# Patient Record
Sex: FEMALE
[De-identification: names, ages, dates, MRNs, and addresses within clinical notes are randomized per-mention and may not be internally consistent; named-entity substitution may affect disease eponyms.]

---

## 2017-07-06 ENCOUNTER — TRANSCRIPTION ENCOUNTER (OUTPATIENT)
Age: 32
End: 2017-07-06

## 2018-08-14 ENCOUNTER — INPATIENT (INPATIENT)
Facility: HOSPITAL | Age: 33
LOS: 2 days | Discharge: HOME | End: 2018-08-17
Attending: OBSTETRICS & GYNECOLOGY | Admitting: OBSTETRICS & GYNECOLOGY
Payer: COMMERCIAL

## 2018-08-14 DIAGNOSIS — O26.899 OTHER SPECIFIED PREGNANCY RELATED CONDITIONS, UNSPECIFIED TRIMESTER: ICD-10-CM

## 2018-08-14 DIAGNOSIS — Z3A.00 WEEKS OF GESTATION OF PREGNANCY NOT SPECIFIED: ICD-10-CM

## 2018-08-14 DIAGNOSIS — Z34.80 ENCOUNTER FOR SUPERVISION OF OTHER NORMAL PREGNANCY, UNSPECIFIED TRIMESTER: ICD-10-CM

## 2018-08-14 RX ORDER — CITRIC ACID/SODIUM CITRATE 300-500 MG
15 SOLUTION, ORAL ORAL EVERY 4 HOURS
Refills: 0 | Status: DISCONTINUED | OUTPATIENT
Start: 2018-08-14 | End: 2018-08-15

## 2018-08-14 RX ORDER — AMPICILLIN TRIHYDRATE 250 MG
2 CAPSULE ORAL ONCE
Refills: 0 | Status: COMPLETED | OUTPATIENT
Start: 2018-08-14 | End: 2018-08-15

## 2018-08-14 RX ORDER — SODIUM CHLORIDE 9 MG/ML
999 INJECTION, SOLUTION INTRAVENOUS ONCE
Refills: 0 | Status: COMPLETED | OUTPATIENT
Start: 2018-08-14 | End: 2018-08-15

## 2018-08-14 RX ORDER — OXYTOCIN 10 UNIT/ML
333.33 VIAL (ML) INJECTION
Qty: 20 | Refills: 0 | Status: COMPLETED | OUTPATIENT
Start: 2018-08-14

## 2018-08-14 RX ORDER — AMPICILLIN TRIHYDRATE 250 MG
1 CAPSULE ORAL EVERY 4 HOURS
Refills: 0 | Status: DISCONTINUED | OUTPATIENT
Start: 2018-08-15 | End: 2018-08-15

## 2018-08-14 RX ORDER — SODIUM CHLORIDE 9 MG/ML
1000 INJECTION, SOLUTION INTRAVENOUS
Refills: 0 | Status: DISCONTINUED | OUTPATIENT
Start: 2018-08-14 | End: 2018-08-15

## 2018-08-14 RX ORDER — AMPICILLIN TRIHYDRATE 250 MG
CAPSULE ORAL
Refills: 0 | Status: DISCONTINUED | OUTPATIENT
Start: 2018-08-15 | End: 2018-08-15

## 2018-08-15 VITALS — WEIGHT: 158.73 LBS | HEIGHT: 64 IN

## 2018-08-15 LAB
BASOPHILS # BLD AUTO: 0 K/UL — SIGNIFICANT CHANGE UP (ref 0–0.2)
BASOPHILS NFR BLD AUTO: 0.1 % — SIGNIFICANT CHANGE UP (ref 0–2)
BLD GP AB SCN SERPL QL: NEGATIVE — SIGNIFICANT CHANGE UP
EOSINOPHIL # BLD AUTO: 0.1 K/UL — SIGNIFICANT CHANGE UP (ref 0–0.5)
EOSINOPHIL NFR BLD AUTO: 0.4 % — SIGNIFICANT CHANGE UP (ref 0–6)
HCT VFR BLD CALC: 34.3 % — LOW (ref 34.5–45)
HGB BLD-MCNC: 11.9 G/DL — SIGNIFICANT CHANGE UP (ref 11.5–15.5)
HIV 1 & 2 AB SERPL IA.RAPID: SIGNIFICANT CHANGE UP
LYMPHOCYTES # BLD AUTO: 1.7 K/UL — SIGNIFICANT CHANGE UP (ref 1–3.3)
LYMPHOCYTES # BLD AUTO: 10 % — LOW (ref 13–44)
MCHC RBC-ENTMCNC: 30.9 PG — SIGNIFICANT CHANGE UP (ref 27–34)
MCHC RBC-ENTMCNC: 34.7 GM/DL — SIGNIFICANT CHANGE UP (ref 32–36)
MCV RBC AUTO: 89.1 FL — SIGNIFICANT CHANGE UP (ref 80–100)
MONOCYTES # BLD AUTO: 1 K/UL — HIGH (ref 0–0.9)
MONOCYTES NFR BLD AUTO: 6 % — SIGNIFICANT CHANGE UP (ref 2–14)
NEUTROPHILS # BLD AUTO: 14 K/UL — HIGH (ref 1.8–7.4)
NEUTROPHILS NFR BLD AUTO: 83.5 % — HIGH (ref 43–77)
PLATELET # BLD AUTO: 255 K/UL — SIGNIFICANT CHANGE UP (ref 150–400)
RBC # BLD: 3.85 M/UL — SIGNIFICANT CHANGE UP (ref 3.8–5.2)
RBC # FLD: 12.7 % — SIGNIFICANT CHANGE UP (ref 10.3–14.5)
RH IG SCN BLD-IMP: POSITIVE — SIGNIFICANT CHANGE UP
RH IG SCN BLD-IMP: POSITIVE — SIGNIFICANT CHANGE UP
T PALLIDUM AB TITR SER: NEGATIVE — SIGNIFICANT CHANGE UP
WBC # BLD: 16.7 K/UL — HIGH (ref 3.8–10.5)
WBC # FLD AUTO: 16.7 K/UL — HIGH (ref 3.8–10.5)

## 2018-08-15 RX ORDER — AER TRAVELER 0.5 G/1
1 SOLUTION RECTAL; TOPICAL EVERY 4 HOURS
Refills: 0 | Status: DISCONTINUED | OUTPATIENT
Start: 2018-08-16 | End: 2018-08-17

## 2018-08-15 RX ORDER — GLYCERIN ADULT
1 SUPPOSITORY, RECTAL RECTAL AT BEDTIME
Refills: 0 | Status: DISCONTINUED | OUTPATIENT
Start: 2018-08-16 | End: 2018-08-17

## 2018-08-15 RX ORDER — ACETAMINOPHEN 500 MG
975 TABLET ORAL EVERY 6 HOURS
Refills: 0 | Status: COMPLETED | OUTPATIENT
Start: 2018-08-15 | End: 2019-07-14

## 2018-08-15 RX ORDER — DOCUSATE SODIUM 100 MG
100 CAPSULE ORAL
Refills: 0 | Status: DISCONTINUED | OUTPATIENT
Start: 2018-08-16 | End: 2018-08-17

## 2018-08-15 RX ORDER — DIPHENHYDRAMINE HCL 50 MG
25 CAPSULE ORAL EVERY 6 HOURS
Refills: 0 | Status: DISCONTINUED | OUTPATIENT
Start: 2018-08-16 | End: 2018-08-17

## 2018-08-15 RX ORDER — OXYTOCIN 10 UNIT/ML
41.67 VIAL (ML) INJECTION
Qty: 20 | Refills: 0 | Status: DISCONTINUED | OUTPATIENT
Start: 2018-08-16 | End: 2018-08-17

## 2018-08-15 RX ORDER — ACETAMINOPHEN 500 MG
975 TABLET ORAL EVERY 6 HOURS
Refills: 0 | Status: COMPLETED | OUTPATIENT
Start: 2018-08-16 | End: 2019-07-15

## 2018-08-15 RX ORDER — OXYCODONE HYDROCHLORIDE 5 MG/1
5 TABLET ORAL
Refills: 0 | Status: DISCONTINUED | OUTPATIENT
Start: 2018-08-16 | End: 2018-08-17

## 2018-08-15 RX ORDER — IBUPROFEN 200 MG
600 TABLET ORAL EVERY 6 HOURS
Refills: 0 | Status: DISCONTINUED | OUTPATIENT
Start: 2018-08-15 | End: 2018-08-17

## 2018-08-15 RX ORDER — PRAMOXINE HYDROCHLORIDE 150 MG/15G
1 AEROSOL, FOAM RECTAL EVERY 4 HOURS
Refills: 0 | Status: DISCONTINUED | OUTPATIENT
Start: 2018-08-16 | End: 2018-08-17

## 2018-08-15 RX ORDER — AER TRAVELER 0.5 G/1
1 SOLUTION RECTAL; TOPICAL EVERY 4 HOURS
Refills: 0 | Status: DISCONTINUED | OUTPATIENT
Start: 2018-08-15 | End: 2018-08-15

## 2018-08-15 RX ORDER — KETOROLAC TROMETHAMINE 30 MG/ML
30 SYRINGE (ML) INJECTION ONCE
Refills: 0 | Status: DISCONTINUED | OUTPATIENT
Start: 2018-08-15 | End: 2018-08-17

## 2018-08-15 RX ORDER — SIMETHICONE 80 MG/1
80 TABLET, CHEWABLE ORAL EVERY 6 HOURS
Refills: 0 | Status: DISCONTINUED | OUTPATIENT
Start: 2018-08-16 | End: 2018-08-17

## 2018-08-15 RX ORDER — OXYCODONE HYDROCHLORIDE 5 MG/1
5 TABLET ORAL EVERY 4 HOURS
Refills: 0 | Status: DISCONTINUED | OUTPATIENT
Start: 2018-08-16 | End: 2018-08-17

## 2018-08-15 RX ORDER — IBUPROFEN 200 MG
600 TABLET ORAL EVERY 6 HOURS
Refills: 0 | Status: COMPLETED | OUTPATIENT
Start: 2018-08-15 | End: 2019-07-14

## 2018-08-15 RX ORDER — PRAMOXINE HYDROCHLORIDE 150 MG/15G
1 AEROSOL, FOAM RECTAL EVERY 4 HOURS
Refills: 0 | Status: DISCONTINUED | OUTPATIENT
Start: 2018-08-15 | End: 2018-08-15

## 2018-08-15 RX ORDER — IBUPROFEN 200 MG
600 TABLET ORAL EVERY 6 HOURS
Refills: 0 | Status: DISCONTINUED | OUTPATIENT
Start: 2018-08-16 | End: 2018-08-17

## 2018-08-15 RX ORDER — OXYTOCIN 10 UNIT/ML
41.67 VIAL (ML) INJECTION
Qty: 20 | Refills: 0 | Status: DISCONTINUED | OUTPATIENT
Start: 2018-08-15 | End: 2018-08-17

## 2018-08-15 RX ORDER — DIBUCAINE 1 %
1 OINTMENT (GRAM) RECTAL EVERY 4 HOURS
Refills: 0 | Status: DISCONTINUED | OUTPATIENT
Start: 2018-08-16 | End: 2018-08-17

## 2018-08-15 RX ORDER — OXYCODONE HYDROCHLORIDE 5 MG/1
5 TABLET ORAL EVERY 4 HOURS
Refills: 0 | Status: DISCONTINUED | OUTPATIENT
Start: 2018-08-15 | End: 2018-08-17

## 2018-08-15 RX ORDER — TETANUS TOXOID, REDUCED DIPHTHERIA TOXOID AND ACELLULAR PERTUSSIS VACCINE, ADSORBED 5; 2.5; 8; 8; 2.5 [IU]/.5ML; [IU]/.5ML; UG/.5ML; UG/.5ML; UG/.5ML
0.5 SUSPENSION INTRAMUSCULAR ONCE
Refills: 0 | Status: DISCONTINUED | OUTPATIENT
Start: 2018-08-16 | End: 2018-08-17

## 2018-08-15 RX ORDER — HYDROCORTISONE 1 %
1 OINTMENT (GRAM) TOPICAL EVERY 4 HOURS
Refills: 0 | Status: DISCONTINUED | OUTPATIENT
Start: 2018-08-15 | End: 2018-08-15

## 2018-08-15 RX ORDER — OXYCODONE HYDROCHLORIDE 5 MG/1
5 TABLET ORAL
Refills: 0 | Status: DISCONTINUED | OUTPATIENT
Start: 2018-08-15 | End: 2018-08-17

## 2018-08-15 RX ORDER — OXYTOCIN 10 UNIT/ML
333.33 VIAL (ML) INJECTION
Qty: 20 | Refills: 0 | Status: DISCONTINUED | OUTPATIENT
Start: 2018-08-15 | End: 2018-08-15

## 2018-08-15 RX ORDER — OXYTOCIN 10 UNIT/ML
4 VIAL (ML) INJECTION
Qty: 30 | Refills: 0 | Status: DISCONTINUED | OUTPATIENT
Start: 2018-08-15 | End: 2018-08-15

## 2018-08-15 RX ORDER — DIBUCAINE 1 %
1 OINTMENT (GRAM) RECTAL EVERY 4 HOURS
Refills: 0 | Status: DISCONTINUED | OUTPATIENT
Start: 2018-08-15 | End: 2018-08-15

## 2018-08-15 RX ORDER — KETOROLAC TROMETHAMINE 30 MG/ML
30 SYRINGE (ML) INJECTION ONCE
Refills: 0 | Status: DISCONTINUED | OUTPATIENT
Start: 2018-08-15 | End: 2018-08-15

## 2018-08-15 RX ORDER — ACETAMINOPHEN 500 MG
975 TABLET ORAL EVERY 6 HOURS
Refills: 0 | Status: DISCONTINUED | OUTPATIENT
Start: 2018-08-15 | End: 2018-08-17

## 2018-08-15 RX ORDER — LANOLIN
1 OINTMENT (GRAM) TOPICAL EVERY 6 HOURS
Refills: 0 | Status: DISCONTINUED | OUTPATIENT
Start: 2018-08-16 | End: 2018-08-17

## 2018-08-15 RX ORDER — SODIUM CHLORIDE 9 MG/ML
3 INJECTION INTRAMUSCULAR; INTRAVENOUS; SUBCUTANEOUS EVERY 8 HOURS
Refills: 0 | Status: DISCONTINUED | OUTPATIENT
Start: 2018-08-15 | End: 2018-08-15

## 2018-08-15 RX ORDER — MAGNESIUM HYDROXIDE 400 MG/1
30 TABLET, CHEWABLE ORAL
Refills: 0 | Status: DISCONTINUED | OUTPATIENT
Start: 2018-08-16 | End: 2018-08-17

## 2018-08-15 RX ORDER — SODIUM CHLORIDE 9 MG/ML
3 INJECTION INTRAMUSCULAR; INTRAVENOUS; SUBCUTANEOUS EVERY 8 HOURS
Refills: 0 | Status: DISCONTINUED | OUTPATIENT
Start: 2018-08-16 | End: 2018-08-17

## 2018-08-15 RX ORDER — HYDROCORTISONE 1 %
1 OINTMENT (GRAM) TOPICAL EVERY 4 HOURS
Refills: 0 | Status: DISCONTINUED | OUTPATIENT
Start: 2018-08-16 | End: 2018-08-17

## 2018-08-15 RX ADMIN — Medication 108 GRAM(S): at 09:18

## 2018-08-15 RX ADMIN — Medication 4 MILLIUNIT(S)/MIN: at 08:13

## 2018-08-15 RX ADMIN — Medication 108 GRAM(S): at 05:10

## 2018-08-15 RX ADMIN — SODIUM CHLORIDE 1998 MILLILITER(S): 9 INJECTION, SOLUTION INTRAVENOUS at 00:38

## 2018-08-15 RX ADMIN — Medication 30 MILLIGRAM(S): at 17:05

## 2018-08-15 RX ADMIN — Medication 975 MILLIGRAM(S): at 21:08

## 2018-08-15 RX ADMIN — Medication 1000 MILLIUNIT(S)/MIN: at 15:07

## 2018-08-15 RX ADMIN — Medication 116 GRAM(S): at 01:10

## 2018-08-15 RX ADMIN — Medication 975 MILLIGRAM(S): at 21:38

## 2018-08-15 RX ADMIN — Medication 108 GRAM(S): at 13:18

## 2018-08-15 RX ADMIN — SODIUM CHLORIDE 250 MILLILITER(S): 9 INJECTION, SOLUTION INTRAVENOUS at 02:24

## 2018-08-15 NOTE — PATIENT PROFILE OB - PATIENT REPRESENTATIVE PHONE
12/26/19        [address] [place] [id number]      Dear Gia Chavez,    [facility] records indicate that you have outstanding lab work and or testing that was ordered for you and has not yet been completed:  Orders Placed This Encounter [id number]

## 2018-08-15 NOTE — PATIENT PROFILE OB - ALERT: PERTINENT HISTORY
1st Trimester Sonogram/20 Week Level II Sonogram/Follow up Sonogram for Growth/Fetal Non-Stress Test (NST)

## 2018-08-16 LAB
HCT VFR BLD CALC: 29 % — LOW (ref 34.5–45)
HGB BLD-MCNC: 9.6 G/DL — LOW (ref 11.5–15.5)

## 2018-08-16 PROCEDURE — 93010 ELECTROCARDIOGRAM REPORT: CPT

## 2018-08-16 RX ORDER — ACETAMINOPHEN 500 MG
975 TABLET ORAL EVERY 6 HOURS
Refills: 0 | Status: DISCONTINUED | OUTPATIENT
Start: 2018-08-16 | End: 2018-08-16

## 2018-08-16 RX ADMIN — Medication 100 MILLIGRAM(S): at 11:02

## 2018-08-16 RX ADMIN — Medication 600 MILLIGRAM(S): at 01:45

## 2018-08-16 RX ADMIN — Medication 1 TABLET(S): at 11:02

## 2018-08-16 RX ADMIN — Medication 975 MILLIGRAM(S): at 12:39

## 2018-08-16 RX ADMIN — Medication 600 MILLIGRAM(S): at 02:15

## 2018-08-16 RX ADMIN — Medication 100 MILLIGRAM(S): at 20:14

## 2018-08-16 RX ADMIN — Medication 600 MILLIGRAM(S): at 20:45

## 2018-08-16 RX ADMIN — Medication 600 MILLIGRAM(S): at 08:07

## 2018-08-16 RX ADMIN — Medication 600 MILLIGRAM(S): at 20:14

## 2018-08-16 RX ADMIN — Medication 975 MILLIGRAM(S): at 13:10

## 2018-08-16 NOTE — CHART NOTE - NSCHARTNOTEFT_GEN_A_CORE
Pt seen and evaluated for chest discomfort. Pt reports dull pain in the center of her chest when she takes a deep breath. Started 5 minutes ago. Exacerbated by movement. Non-radiating. Reproducible with palpation. Pt reports she pushed for over an hour during her delivery earlier today. Denies calf pain. Denied difficulty breathing, shortness of breath, palpitations.     PE:  VS: /69, HR 60, RR 18, %O2: 96% RA  GA: NAD  Cardiac: RRR  Chest: tender to deep palpation centrally over sternum   Resp: clear to auscultation in all lung fields. No wheezes/rales/rhonchi  Ext: no calf tenderness    Bedside EKG: NSR    A&P: New onset chest discomfort, likely MUSK 2/2 to pushing during labor  -EKG WNL  -Continue to monitor, notify is pain worsens or develops SOB/difficulty breathing      D/w  Melody PGY-1 Pt seen and evaluated for chest discomfort. Pt reports dull pain in the center of her chest when she takes a deep breath. Started 5 minutes ago. Exacerbated by movement. Non-radiating. Reproducible with palpation. Pt reports she pushed for over an hour during her delivery earlier today. Denies calf pain. Denied difficulty breathing, shortness of breath, palpitations.     PE:  VS: /69, HR 60, RR 18, %O2: 96% RA  GA: NAD  Cardiac: RRR  Chest: tender to deep palpation centrally over sternum   Resp: clear to auscultation in all lung fields. No wheezes/rales/rhonchi  Ext: no calf tenderness    Bedside EKG: NSR    A&P: New onset chest discomfort, likely MUSK 2/2 to pushing during labor  -EKG WNL  -Continue to monitor, notify is pain worsens or develops SOB/difficulty breathing      D/w Dr. Webber, PGY-3  Melody PGY-1

## 2018-08-16 NOTE — PROGRESS NOTE ADULT - PROBLEM SELECTOR PLAN 1
- Pain well controlled, continue current pain regimen  - Increase ambulation, SCDs when not ambulating  - Continue regular diet  -Breast Feeding    Kenneth Mendoza PGY-1

## 2018-08-17 ENCOUNTER — TRANSCRIPTION ENCOUNTER (OUTPATIENT)
Age: 33
End: 2018-08-17

## 2018-08-17 VITALS
RESPIRATION RATE: 18 BRPM | DIASTOLIC BLOOD PRESSURE: 70 MMHG | SYSTOLIC BLOOD PRESSURE: 113 MMHG | HEART RATE: 60 BPM | TEMPERATURE: 98 F

## 2018-08-17 PROCEDURE — 86900 BLOOD TYPING SEROLOGIC ABO: CPT

## 2018-08-17 PROCEDURE — 59050 FETAL MONITOR W/REPORT: CPT

## 2018-08-17 PROCEDURE — 86901 BLOOD TYPING SEROLOGIC RH(D): CPT

## 2018-08-17 PROCEDURE — 86780 TREPONEMA PALLIDUM: CPT

## 2018-08-17 PROCEDURE — 59025 FETAL NON-STRESS TEST: CPT

## 2018-08-17 PROCEDURE — 85014 HEMATOCRIT: CPT

## 2018-08-17 PROCEDURE — 85018 HEMOGLOBIN: CPT

## 2018-08-17 PROCEDURE — 85027 COMPLETE CBC AUTOMATED: CPT

## 2018-08-17 PROCEDURE — G0463: CPT

## 2018-08-17 PROCEDURE — 93005 ELECTROCARDIOGRAM TRACING: CPT

## 2018-08-17 PROCEDURE — 86850 RBC ANTIBODY SCREEN: CPT

## 2018-08-17 PROCEDURE — 86703 HIV-1/HIV-2 1 RESULT ANTBDY: CPT

## 2018-08-17 RX ORDER — ACETAMINOPHEN 500 MG
3 TABLET ORAL
Qty: 0 | Refills: 0 | DISCHARGE
Start: 2018-08-17

## 2018-08-17 RX ORDER — IBUPROFEN 200 MG
1 TABLET ORAL
Qty: 0 | Refills: 0 | DISCHARGE
Start: 2018-08-17

## 2018-08-17 RX ADMIN — Medication 100 MILLIGRAM(S): at 11:28

## 2018-08-17 RX ADMIN — Medication 600 MILLIGRAM(S): at 06:20

## 2018-08-17 RX ADMIN — Medication 600 MILLIGRAM(S): at 12:10

## 2018-08-17 RX ADMIN — Medication 1 TABLET(S): at 11:28

## 2018-08-17 RX ADMIN — Medication 600 MILLIGRAM(S): at 05:52

## 2018-08-17 RX ADMIN — Medication 600 MILLIGRAM(S): at 11:28

## 2018-08-17 NOTE — DISCHARGE NOTE OB - CARE PLAN
Principal Discharge DX:	Vaginal delivery  Goal:	return to prenatal state  Assessment and plan of treatment:	pelvic rest, limit activity   regular diet

## 2018-08-17 NOTE — DISCHARGE NOTE OB - CARE PROVIDER_API CALL
Andie Batista), Obstetrics and Gynecology  [address] [facility] [address]  [place] [address]  Phone: [phone]  Fax: [phone]

## 2018-08-17 NOTE — DISCHARGE NOTE OB - MATERIALS PROVIDED
[facility] [place] Screening Program/Breastfeeding Log/Breastfeeding Mother’s Support Group Information/Guide to Postpartum Care/[facility] Hearing Screen Program/Back To Sleep Handout/Shaken Baby Prevention Handout/Breastfeeding Guide and Packet/Birth Certificate Instructions

## 2018-08-17 NOTE — DISCHARGE NOTE OB - PATIENT PORTAL LINK FT
You can access the [facility] Patient Portal, offered by [facility], by registering with the following website: [email or web address]

## 2018-08-17 NOTE — PROGRESS NOTE ADULT - SUBJECTIVE AND OBJECTIVE BOX
pt seen and eval by me   doing well   dc home
OB Progress Note:  PPD#1    S: 33yo  PPD#1 s/p . Patient feels well. Pain is well controlled. She is tolerating a regular diet and passing flatus. She is voiding spontaneously, and ambulating without difficulty. Denies CP/SOB. Denies lightheadedness/dizziness. Denies N/V. Denies heavy vaginal bleeding.    O:  Vitals:  Vital Signs Last 24 Hrs  T(C): 36.8 (16 Aug 2018 07:05), Max: 37.4 (15 Aug 2018 16:15)  T(F): 98.3 (16 Aug 2018 07:05), Max: 99.3 (15 Aug 2018 16:15)  HR: 71 (16 Aug 2018 07:05) (60 - 84)  BP: 123/84 (16 Aug 2018 07:05) (110/69 - 137/63)  BP(mean): --  RR: 18 (16 Aug 2018 07:05) (18 - 18)  SpO2: 99% (16 Aug 2018 07:05) (96% - 100%)    MEDICATIONS  (STANDING):  acetaminophen   Tablet. 975 milliGRAM(s) Oral every 6 hours  acetaminophen   Tablet. 975 milliGRAM(s) Oral every 6 hours  diphtheria/tetanus/pertussis (acellular) Vaccine (ADAcel) 0.5 milliLiter(s) IntraMuscular once  ibuprofen  Tablet 600 milliGRAM(s) Oral every 6 hours  ibuprofen  Tablet 600 milliGRAM(s) Oral every 6 hours  ketorolac   Injectable 30 milliGRAM(s) IV Push once  oxyCODONE    IR 5 milliGRAM(s) Oral every 3 hours  oxyCODONE    IR 5 milliGRAM(s) Oral every 3 hours  oxytocin Infusion 41.667 milliUNIT(s)/Min (125 mL/Hr) IV Continuous <Continuous>  oxytocin Infusion 41.667 milliUNIT(s)/Min (125 mL/Hr) IV Continuous <Continuous>  prenatal multivitamin 1 Tablet(s) Oral daily  sodium chloride 0.9% lock flush 3 milliLiter(s) IV Push every 8 hours      Labs:  Blood type: A Positive  Rubella IgG: RPR: Negative                          11.9   16.7<H> >-----------< 255    ( 08-15 @ 00:48 )             34.3<L>        Physical Exam:  General: NAD  Abdomen: soft, non-tender, non-distended, fundus firm  Vaginal: Lochia wnl  Extremities: No erythema/edema

## 2019-02-11 ENCOUNTER — TRANSCRIPTION ENCOUNTER (OUTPATIENT)
Age: 34
End: 2019-02-11

## 2019-12-13 NOTE — PATIENT PROFILE OB - HEIGHT IN CM
Please notify the patient that her ultrasound ultimately was normal.  The endometrium looked normal without any masses which was we are focused on given the placental tissue noted on ultrasound.   Her hCG was negative at that time given normal ultrasound an 162.56

## 2020-01-28 ENCOUNTER — TRANSCRIPTION ENCOUNTER (OUTPATIENT)
Age: 35
End: 2020-01-28

## 2021-05-13 ENCOUNTER — RESULT REVIEW (OUTPATIENT)
Age: 36
End: 2021-05-13

## 2021-09-09 ENCOUNTER — RESULT REVIEW (OUTPATIENT)
Age: 36
End: 2021-09-09

## 2021-12-14 ENCOUNTER — APPOINTMENT (OUTPATIENT)
Dept: PULMONOLOGY | Facility: CLINIC | Age: 36
End: 2021-12-14

## 2021-12-15 ENCOUNTER — TRANSCRIPTION ENCOUNTER (OUTPATIENT)
Age: 36
End: 2021-12-15

## 2021-12-15 ENCOUNTER — APPOINTMENT (OUTPATIENT)
Dept: PULMONOLOGY | Facility: CLINIC | Age: 36
End: 2021-12-15
Payer: COMMERCIAL

## 2021-12-15 PROBLEM — Z00.00 ENCOUNTER FOR PREVENTIVE HEALTH EXAMINATION: Status: ACTIVE | Noted: 2021-12-15

## 2021-12-15 PROCEDURE — 99204 OFFICE O/P NEW MOD 45 MIN: CPT | Mod: 95

## 2021-12-15 NOTE — HISTORY OF PRESENT ILLNESS
[Home] : at home, [unfilled] , at the time of the visit. [Medical Office: ([facility])___] : at the medical office located in  [Verbal consent obtained from patient] : the patient, [unfilled] [FreeTextEntry1] : 36-year-old woman, currently 10 weeks pregnant.  IVF.  Referred for consultation to the [facility] program for Covid infection.\par \par Patient received the [employer] vaccine in March.  She has not received a booster.\par \par She has no other past medical history.\par \par This is her second pregnancy\par \par On December 13 patient began to feel unwell, and then lost her sense of taste.  She tested positive on December 13 the same day.  She is feeling a bit worse at this point.  She has a headache, no taste or smell, nasal congestion, cough with throat clearing.  She also feels she is getting out of breath with significant exertion.  She denies any chest pain, pain or swelling in the legs.  She denies any fevers.  She has minimal nausea, but is taking good oral intake.  She does not have a pulse ox, but she is able to get 1\par \par Her only current medications are her meds, folic acid, and doxylamine for nausea.\par She denies any allergies to medications.\par \par On the video, patient appears comfortable and in no distress.  She has nasal congestion, with frequent clearing of her throat.  She is speaking clearly and easily without respiratory distress or accessory muscle use.\par \par Healthy 36-year-old woman, 10 weeks pregnant, and what I would consider at best partially vaccinated\par We discussed the option of Mab infusion.  Her OB/GYN had also told her about this and explained that it was safe in pregnancy.  Pregnancy is currently an approved risk factor for this treatment, and I believe is certainly warranted given her only partial vaccination history.  Patient understands and agrees.\par I have made the referral for monoclonal antibodies\par Patient is going to get a pulse ox today.  I advised her to call us ASAP if her pulse ox falls below 95 or if she experiences any worsening in symptoms such as shortness of breath.\par I will follow up with her tomorrow\par

## 2021-12-16 ENCOUNTER — APPOINTMENT (OUTPATIENT)
Dept: DISASTER EMERGENCY | Facility: HOSPITAL | Age: 36
End: 2021-12-16

## 2021-12-16 ENCOUNTER — APPOINTMENT (OUTPATIENT)
Dept: PULMONOLOGY | Facility: CLINIC | Age: 36
End: 2021-12-16

## 2021-12-16 ENCOUNTER — EMERGENCY (EMERGENCY)
Facility: HOSPITAL | Age: 36
LOS: 1 days | Discharge: HOME | End: 2021-12-16
Attending: EMERGENCY MEDICINE
Payer: COMMERCIAL

## 2021-12-16 VITALS
HEART RATE: 91 BPM | HEIGHT: 64 IN | TEMPERATURE: 98 F | WEIGHT: 121.92 LBS | DIASTOLIC BLOOD PRESSURE: 85 MMHG | RESPIRATION RATE: 18 BRPM | OXYGEN SATURATION: 99 % | SYSTOLIC BLOOD PRESSURE: 121 MMHG

## 2021-12-16 VITALS
TEMPERATURE: 98 F | OXYGEN SATURATION: 99 % | HEART RATE: 81 BPM | SYSTOLIC BLOOD PRESSURE: 120 MMHG | RESPIRATION RATE: 18 BRPM | DIASTOLIC BLOOD PRESSURE: 74 MMHG

## 2021-12-16 PROCEDURE — L9998: CPT

## 2021-12-16 PROCEDURE — M0243: CPT

## 2021-12-16 PROCEDURE — 76801 OB US < 14 WKS SINGLE FETUS: CPT | Mod: 26

## 2021-12-16 PROCEDURE — 76801 OB US < 14 WKS SINGLE FETUS: CPT

## 2021-12-16 RX ORDER — SODIUM CHLORIDE 9 MG/ML
250 INJECTION INTRAMUSCULAR; INTRAVENOUS; SUBCUTANEOUS
Refills: 0 | Status: COMPLETED | OUTPATIENT
Start: 2021-12-16 | End: 2021-12-16

## 2021-12-16 RX ADMIN — SODIUM CHLORIDE 310 MILLILITER(S): 9 INJECTION INTRAMUSCULAR; INTRAVENOUS; SUBCUTANEOUS at 11:50

## 2021-12-16 NOTE — ED PROVIDER NOTE - NSFOLLOWUPINSTRUCTIONS_ED_ALL_ED_FT
Your diagnosis this visit was: Monoclonal antibody infusion for COVID-19 infection (Casirivimab and Imdevimab)    No new medications were prescribed.     Please follow up with your medical doctor.     Please see the attached discharge papers with information regarding your infusion and possible side effects.     Please return to the Emergency Department if you experience any of the following symptoms:  - Fever, rash, swelling of the lips or tongue  - Headache  - Dizziness  - Throat irritation  - Lightheaded / Fainting  - Worsening shortness of breath or trouble breathing  - Redness or swelling at the site of your IV  - Body aches  - Persistent or severe vomiting  Or any other concerning symptoms

## 2021-12-16 NOTE — ED PROVIDER NOTE - PROGRESS NOTE DETAILS
ATTG: : patient evaluated by OB and had US to confirm IUP and . no ob intervention, can dc home to follow with her ob.

## 2021-12-16 NOTE — CONSULT NOTE ADULT - ASSESSMENT
35yo  at 10w4d with symptomatic COVID-19 infection, tested + on Monday here for monoclonal antibody infusion as pregnancy is risk factor for severe disease.    -FH wnl  -consent obtained by ED and r/b/a again discussed w patient  -will need FH prior to d/c  -will f/u outpatient 14 d from diagnosis with OB    D/w Dr. Prince NURIS [place] PGY-4

## 2021-12-16 NOTE — ED PROVIDER NOTE - NS ED ROS FT
left upper arm no diarrhea. no abd pain. no vaginal or rectal bleeding. remainder of ROS as noted in H and P

## 2021-12-16 NOTE — ED PROVIDER NOTE - PATIENT PORTAL LINK FT
You can access the FollowMyHealth Patient Portal offered by [facility] by registering at the following website: [email or web address]. By joining [employer]’s FollowMyHealth portal, you will also be able to view your health information using other applications (apps) compatible with our system.

## 2021-12-16 NOTE — ED PROVIDER NOTE - CLINICAL SUMMARY MEDICAL DECISION MAKING FREE TEXT BOX
ATTG: : COVID -19 infection mild 10 weeks preg. OB consulted. FHR via [employer]. discussed risks / benefits of infusion at length and answered all questions.    Please do not billed this scheduled visit for MAB infusion of Regeneron

## 2021-12-16 NOTE — ED PROVIDER NOTE - PHYSICAL EXAMINATION
Gen.  no acute distress  HEENT:  perrl eomi  Lungs:  b/l bs  CVS: S1S2   Abd; soft non tender     Ext: no pitting edema or erythema  Neuro: aaox3, clear speech  MSK: moving all ext spon.

## 2021-12-16 NOTE — CONSULT NOTE ADULT - SUBJECTIVE AND OBJECTIVE BOX
R4 Consult Note    HPI: Patient is a 35yo  at 10w4d (TERENCE by IVF transfer) here for monoclonal antibody infusion. Became symptomatic and tested + for SARS CoV-2 on Monday. She feels well now. Denies fever, chills, SOB, abdominal pain, n/v. Is more tired than usual. Had spotting earlier in pregnancy and was found to have a subchorionic hematoma. Otherwise pregnancy so far uncomplicated.    Name of GYN Physician: Dr. James    POB:  NSVDx1 2018    Pgyn: Denies fibroids, cysts, endometriosis, STI's, Abnormal pap smears     Home meds: [facility] Meds:   MEDICATIONS  (STANDING):  casirivimab/imdevimab in sodium chloride 0.9% (EUA) IVPB 100 milliLiter(s) IV Intermittent once  sodium chloride 0.9%. 250 milliLiter(s) (310 mL/Hr) IV Continuous <Continuous>    Allergies  No Known Allergies  Intolerances    PAST MEDICAL & SURGICAL HISTORY:  No pertinent past medical history  No significant past surgical history    FAMILY HISTORY:  No pertinent family history in first degree relatives    Social History:  Denies smoking use, drug use, alcohol use.       Vital Signs Last 24 Hrs  T(C): 36.6 (16 Dec 2021 07:47), Max: 36.7 (16 Dec 2021 07:29)  T(F): 97.9 (16 Dec 2021 07:47), Max: 98.1 (16 Dec 2021 07:29)  HR: 87 (16 Dec 2021 07:47) (87 - 91)  BP: 117/77 (16 Dec 2021 07:47) (117/77 - 121/85)  BP(mean): --  RR: 18 (16 Dec 2021 07:47) (18 - 18)  SpO2: 100% (16 Dec 2021 07:47) (99% - 100%)    Physical Exam:   General: sitting comfortably in bed, NAD   Abd: Soft, non-tender, non-distended.   BSUS: SLIUP with FH of 167   :  No bleeding on pad.     Ext: non-tender b/l, no edema

## 2021-12-16 NOTE — ED PROVIDER NOTE - OBJECTIVE STATEMENT
37 y/o f with pmhx none sig, approx 10 weeks preg LMP 9/2021, sent by her OB for MAB. Patient was diagnosed on Monday with COVID-19, had received her J and J vaccine in Aug. Initially had mild cough, sore throat, headache, short of breath (mild) and aches. She lost sense of smell / taste on Monday which led to her testing. no abd pain. no weakness or numbness. Mild nausea

## 2021-12-17 ENCOUNTER — APPOINTMENT (OUTPATIENT)
Dept: PULMONOLOGY | Facility: CLINIC | Age: 36
End: 2021-12-17

## 2021-12-17 ENCOUNTER — TRANSCRIPTION ENCOUNTER (OUTPATIENT)
Age: 36
End: 2021-12-17

## 2022-04-22 NOTE — PATIENT PROFILE OB - WEIGHT PRE-PREGNANCY IN KG
58 Split-Thickness Skin Graft Text: The defect edges were debeveled with a #15 scalpel blade.  Given the location of the defect, shape of the defect and the proximity to free margins a split thickness skin graft was deemed most appropriate.  Using a sterile surgical marker, the primary defect shape was transferred to the donor site. The split thickness graft was then harvested.  The skin graft was then placed in the primary defect and oriented appropriately.

## 2022-07-09 ENCOUNTER — INPATIENT (INPATIENT)
Facility: HOSPITAL | Age: 37
LOS: 0 days | Discharge: HOME | End: 2022-07-10
Attending: OBSTETRICS & GYNECOLOGY | Admitting: OBSTETRICS & GYNECOLOGY
Payer: COMMERCIAL

## 2022-07-09 VITALS — HEART RATE: 78 BPM | OXYGEN SATURATION: 100 %

## 2022-07-09 DIAGNOSIS — Z3A.00 WEEKS OF GESTATION OF PREGNANCY NOT SPECIFIED: ICD-10-CM

## 2022-07-09 DIAGNOSIS — O26.899 OTHER SPECIFIED PREGNANCY RELATED CONDITIONS, UNSPECIFIED TRIMESTER: ICD-10-CM

## 2022-07-09 DIAGNOSIS — Z34.80 ENCOUNTER FOR SUPERVISION OF OTHER NORMAL PREGNANCY, UNSPECIFIED TRIMESTER: ICD-10-CM

## 2022-07-09 LAB
BASOPHILS # BLD AUTO: 0.06 K/UL — SIGNIFICANT CHANGE UP (ref 0–0.2)
BASOPHILS NFR BLD AUTO: 0.6 % — SIGNIFICANT CHANGE UP (ref 0–2)
BLD GP AB SCN SERPL QL: NEGATIVE — SIGNIFICANT CHANGE UP
COVID-19 SPIKE DOMAIN AB INTERP: POSITIVE
COVID-19 SPIKE DOMAIN ANTIBODY RESULT: >250 U/ML — HIGH
EOSINOPHIL # BLD AUTO: 0.13 K/UL — SIGNIFICANT CHANGE UP (ref 0–0.5)
EOSINOPHIL NFR BLD AUTO: 1.2 % — SIGNIFICANT CHANGE UP (ref 0–6)
HCT VFR BLD CALC: 37.8 % — SIGNIFICANT CHANGE UP (ref 34.5–45)
HGB BLD-MCNC: 12.1 G/DL — SIGNIFICANT CHANGE UP (ref 11.5–15.5)
IMM GRANULOCYTES NFR BLD AUTO: 1.1 % — SIGNIFICANT CHANGE UP (ref 0–1.5)
LYMPHOCYTES # BLD AUTO: 3.33 K/UL — HIGH (ref 1–3.3)
LYMPHOCYTES # BLD AUTO: 30.9 % — SIGNIFICANT CHANGE UP (ref 13–44)
MCHC RBC-ENTMCNC: 29.2 PG — SIGNIFICANT CHANGE UP (ref 27–34)
MCHC RBC-ENTMCNC: 32 GM/DL — SIGNIFICANT CHANGE UP (ref 32–36)
MCV RBC AUTO: 91.1 FL — SIGNIFICANT CHANGE UP (ref 80–100)
MONOCYTES # BLD AUTO: 0.98 K/UL — HIGH (ref 0–0.9)
MONOCYTES NFR BLD AUTO: 9.1 % — SIGNIFICANT CHANGE UP (ref 2–14)
NEUTROPHILS # BLD AUTO: 6.17 K/UL — SIGNIFICANT CHANGE UP (ref 1.8–7.4)
NEUTROPHILS NFR BLD AUTO: 57.1 % — SIGNIFICANT CHANGE UP (ref 43–77)
NRBC # BLD: 0 /100 WBCS — SIGNIFICANT CHANGE UP (ref 0–0)
PLATELET # BLD AUTO: 241 K/UL — SIGNIFICANT CHANGE UP (ref 150–400)
RBC # BLD: 4.15 M/UL — SIGNIFICANT CHANGE UP (ref 3.8–5.2)
RBC # FLD: 12.1 % — SIGNIFICANT CHANGE UP (ref 10.3–14.5)
RH IG SCN BLD-IMP: POSITIVE — SIGNIFICANT CHANGE UP
SARS-COV-2 IGG+IGM SERPL QL IA: >250 U/ML — HIGH
SARS-COV-2 IGG+IGM SERPL QL IA: POSITIVE
SARS-COV-2 RNA SPEC QL NAA+PROBE: SIGNIFICANT CHANGE UP
T PALLIDUM AB TITR SER: NEGATIVE — SIGNIFICANT CHANGE UP
WBC # BLD: 10.79 K/UL — HIGH (ref 3.8–10.5)
WBC # FLD AUTO: 10.79 K/UL — HIGH (ref 3.8–10.5)

## 2022-07-09 RX ORDER — IBUPROFEN 200 MG
600 TABLET ORAL EVERY 6 HOURS
Refills: 0 | Status: COMPLETED | OUTPATIENT
Start: 2022-07-09 | End: 2023-06-07

## 2022-07-09 RX ORDER — KETOROLAC TROMETHAMINE 30 MG/ML
30 SYRINGE (ML) INJECTION ONCE
Refills: 0 | Status: DISCONTINUED | OUTPATIENT
Start: 2022-07-09 | End: 2022-07-09

## 2022-07-09 RX ORDER — AMPICILLIN TRIHYDRATE 250 MG
2 CAPSULE ORAL ONCE
Refills: 0 | Status: COMPLETED | OUTPATIENT
Start: 2022-07-09 | End: 2022-07-09

## 2022-07-09 RX ORDER — PRAMOXINE HYDROCHLORIDE 150 MG/15G
1 AEROSOL, FOAM RECTAL EVERY 4 HOURS
Refills: 0 | Status: DISCONTINUED | OUTPATIENT
Start: 2022-07-09 | End: 2022-07-10

## 2022-07-09 RX ORDER — OXYCODONE HYDROCHLORIDE 5 MG/1
5 TABLET ORAL ONCE
Refills: 0 | Status: DISCONTINUED | OUTPATIENT
Start: 2022-07-09 | End: 2022-07-10

## 2022-07-09 RX ORDER — BENZOCAINE 10 %
1 GEL (GRAM) MUCOUS MEMBRANE EVERY 6 HOURS
Refills: 0 | Status: DISCONTINUED | OUTPATIENT
Start: 2022-07-09 | End: 2022-07-10

## 2022-07-09 RX ORDER — SIMETHICONE 80 MG/1
80 TABLET, CHEWABLE ORAL EVERY 4 HOURS
Refills: 0 | Status: DISCONTINUED | OUTPATIENT
Start: 2022-07-09 | End: 2022-07-10

## 2022-07-09 RX ORDER — IBUPROFEN 200 MG
600 TABLET ORAL EVERY 6 HOURS
Refills: 0 | Status: DISCONTINUED | OUTPATIENT
Start: 2022-07-09 | End: 2022-07-10

## 2022-07-09 RX ORDER — AER TRAVELER 0.5 G/1
1 SOLUTION RECTAL; TOPICAL EVERY 4 HOURS
Refills: 0 | Status: DISCONTINUED | OUTPATIENT
Start: 2022-07-09 | End: 2022-07-10

## 2022-07-09 RX ORDER — CHLORHEXIDINE GLUCONATE 213 G/1000ML
1 SOLUTION TOPICAL ONCE
Refills: 0 | Status: DISCONTINUED | OUTPATIENT
Start: 2022-07-09 | End: 2022-07-09

## 2022-07-09 RX ORDER — MAGNESIUM HYDROXIDE 400 MG/1
30 TABLET, CHEWABLE ORAL
Refills: 0 | Status: DISCONTINUED | OUTPATIENT
Start: 2022-07-09 | End: 2022-07-10

## 2022-07-09 RX ORDER — DIBUCAINE 1 %
1 OINTMENT (GRAM) RECTAL EVERY 6 HOURS
Refills: 0 | Status: DISCONTINUED | OUTPATIENT
Start: 2022-07-09 | End: 2022-07-10

## 2022-07-09 RX ORDER — HYDROCORTISONE 1 %
1 OINTMENT (GRAM) TOPICAL EVERY 6 HOURS
Refills: 0 | Status: DISCONTINUED | OUTPATIENT
Start: 2022-07-09 | End: 2022-07-10

## 2022-07-09 RX ORDER — CITRIC ACID/SODIUM CITRATE 300-500 MG
15 SOLUTION, ORAL ORAL EVERY 6 HOURS
Refills: 0 | Status: DISCONTINUED | OUTPATIENT
Start: 2022-07-09 | End: 2022-07-09

## 2022-07-09 RX ORDER — OXYTOCIN 10 UNIT/ML
333.33 VIAL (ML) INJECTION
Qty: 20 | Refills: 0 | Status: DISCONTINUED | OUTPATIENT
Start: 2022-07-09 | End: 2022-07-10

## 2022-07-09 RX ORDER — SODIUM CHLORIDE 9 MG/ML
3 INJECTION INTRAMUSCULAR; INTRAVENOUS; SUBCUTANEOUS EVERY 8 HOURS
Refills: 0 | Status: DISCONTINUED | OUTPATIENT
Start: 2022-07-09 | End: 2022-07-10

## 2022-07-09 RX ORDER — TETANUS TOXOID, REDUCED DIPHTHERIA TOXOID AND ACELLULAR PERTUSSIS VACCINE, ADSORBED 5; 2.5; 8; 8; 2.5 [IU]/.5ML; [IU]/.5ML; UG/.5ML; UG/.5ML; UG/.5ML
0.5 SUSPENSION INTRAMUSCULAR ONCE
Refills: 0 | Status: DISCONTINUED | OUTPATIENT
Start: 2022-07-09 | End: 2022-07-10

## 2022-07-09 RX ORDER — OXYCODONE HYDROCHLORIDE 5 MG/1
5 TABLET ORAL
Refills: 0 | Status: DISCONTINUED | OUTPATIENT
Start: 2022-07-09 | End: 2022-07-10

## 2022-07-09 RX ORDER — OXYTOCIN 10 UNIT/ML
2 VIAL (ML) INJECTION
Qty: 30 | Refills: 0 | Status: DISCONTINUED | OUTPATIENT
Start: 2022-07-09 | End: 2022-07-10

## 2022-07-09 RX ORDER — SODIUM CHLORIDE 9 MG/ML
1000 INJECTION, SOLUTION INTRAVENOUS
Refills: 0 | Status: DISCONTINUED | OUTPATIENT
Start: 2022-07-09 | End: 2022-07-09

## 2022-07-09 RX ORDER — ACETAMINOPHEN 500 MG
975 TABLET ORAL
Refills: 0 | Status: DISCONTINUED | OUTPATIENT
Start: 2022-07-09 | End: 2022-07-10

## 2022-07-09 RX ORDER — AMPICILLIN TRIHYDRATE 250 MG
1 CAPSULE ORAL EVERY 4 HOURS
Refills: 0 | Status: DISCONTINUED | OUTPATIENT
Start: 2022-07-09 | End: 2022-07-09

## 2022-07-09 RX ORDER — LANOLIN
1 OINTMENT (GRAM) TOPICAL EVERY 6 HOURS
Refills: 0 | Status: DISCONTINUED | OUTPATIENT
Start: 2022-07-09 | End: 2022-07-10

## 2022-07-09 RX ORDER — DIPHENHYDRAMINE HCL 50 MG
25 CAPSULE ORAL EVERY 6 HOURS
Refills: 0 | Status: DISCONTINUED | OUTPATIENT
Start: 2022-07-09 | End: 2022-07-10

## 2022-07-09 RX ORDER — OXYTOCIN 10 UNIT/ML
333.33 VIAL (ML) INJECTION
Qty: 20 | Refills: 0 | Status: DISCONTINUED | OUTPATIENT
Start: 2022-07-09 | End: 2022-07-09

## 2022-07-09 RX ADMIN — Medication 108 GRAM(S): at 08:33

## 2022-07-09 RX ADMIN — Medication 975 MILLIGRAM(S): at 17:32

## 2022-07-09 RX ADMIN — Medication 975 MILLIGRAM(S): at 15:53

## 2022-07-09 RX ADMIN — Medication 600 MILLIGRAM(S): at 23:59

## 2022-07-09 RX ADMIN — Medication 30 MILLIGRAM(S): at 13:10

## 2022-07-09 RX ADMIN — Medication 975 MILLIGRAM(S): at 21:40

## 2022-07-09 RX ADMIN — Medication 200 GRAM(S): at 04:39

## 2022-07-09 RX ADMIN — Medication 975 MILLIGRAM(S): at 21:11

## 2022-07-09 NOTE — OB PROVIDER H&P - ASSESSMENT
35yo  at 40w presenting after PROM at 2am  - Admit to L&D  - EFM & [place]  - Routine labs  - CLD & IVF  - GBS pos ampicillin  - PO Cytotec for induction     D/w Dr. Maegan Stewart PGY2

## 2022-07-09 NOTE — OB RN DELIVERY SUMMARY - NS_SEPSISRSKCALC_OBGYN_ALL_OB_FT
EOS calculated successfully. EOS Risk Factor: 0.5/1000 live births ([facility] national incidence); GA=40w;Temp=98.96; ROM=9.783; GBS='Positive'; Antibiotics='GBS specific antibiotics > 2 hrs prior to birth'

## 2022-07-09 NOTE — OB RN DELIVERY SUMMARY - NSSELHIDDEN_OBGYN_ALL_OB_FT
[NS_DeliveryAttending1_OBGYN_ALL_OB_FT:[id number]],[NS_DeliveryRN_OBGYN_ALL_OB_FT:[id number]],[NS_CirculateRN2_OBGYN_ALL_OB_FT:[id number]]

## 2022-07-09 NOTE — OB PROVIDER LABOR PROGRESS NOTE - NS_SUBJECTIVE/OBJECTIVE_OBGYN_ALL_OB_FT
Pt evaluated for cervical change
NP Chart Note:    The patient was examined and found to be 9.5/100/-2 ruptured clear fluid

## 2022-07-09 NOTE — OB PROVIDER LABOR PROGRESS NOTE - ASSESSMENT
37 y/o  @ 40 weeks admitted in labor  -anesthesia consult    d/w Dr. Maegan Perez NP
Pt presented with SROM currently making no cervical change  - will augment with pitocin 2x2    dw Dr.Zubkina Italo Russell PGY4

## 2022-07-09 NOTE — PRE-ANESTHESIA EVALUATION ADULT - NSANTHOSAYNRD_GEN_A_CORE
No. JAYCEE screening performed.  STOP BANG Legend: 0-2 = LOW Risk; 3-4 = INTERMEDIATE Risk; 5-8 = HIGH Risk

## 2022-07-09 NOTE — OB PROVIDER DELIVERY SUMMARY - NSPROVIDERDELIVERYNOTE_OBGYN_ALL_OB_FT
of liveborn male infant in OA position.  No mec  Placenta delivered spontaneously and intact  Second degree laceration repaired with 2.0 and 3.0 vicryl rapide  Count correct x 2    MD Cricket

## 2022-07-09 NOTE — OB PROVIDER H&P - NSHPPHYSICALEXAM_GEN_ALL_CORE
Gen: NAD  Abd: Soft, nontender   Ext: No edema, no erythema    SVE 3/60/-3    Speculum + pooling, + nitrazine

## 2022-07-09 NOTE — OB RN PATIENT PROFILE - BREAST MILK PROVIDES PROTECTION AGAINST INFECTION
[FreeTextEntry1] : Cellulitis, edema, rule out DVT--Stop Amlodipine 10 mg daily and start Furosemide 20 mg daily. Start Keflex 500 mg bid and see Dr Burton (Dr Burton contacted)
Statement Selected

## 2022-07-09 NOTE — OB RN TRIAGE NOTE - FALL HARM RISK - UNIVERSAL INTERVENTIONS
Bed in lowest position, wheels locked, appropriate side rails in place/Call bell, personal items and telephone in reach/Instruct patient to call for assistance before getting out of bed or chair/Non-slip footwear when patient is out of bed/[place] to call system/Physically safe environment - no spills, clutter or unnecessary equipment/Purposeful Proactive Rounding/Room/bathroom lighting operational, light cord in reach

## 2022-07-09 NOTE — OB PROVIDER LABOR PROGRESS NOTE - NS_OBIHIFHRDETAILS_OBGYN_ALL_OB_FT
135 moderate variability, + acels, -decels, category 1 tracing
120, mod josé miguel, + accels, - decels ARMEN

## 2022-07-09 NOTE — OB PROVIDER DELIVERY SUMMARY - NSSELHIDDEN_OBGYN_ALL_OB_FT
[NS_DeliveryAttending1_OBGYN_ALL_OB_FT:[id number]],[NS_DeliveryRN_OBGYN_ALL_OB_FT:[id number]],[NS_CirculateRN2_OBGYN_ALL_OB_FT:[id number]=]

## 2022-07-09 NOTE — OB PROVIDER H&P - HISTORY OF PRESENT ILLNESS
35yo  at 40w presenting after gross ROM at 2am. Having intermittent CTX. No VB. Good FM.     Denies fever, chills, nausea, vomiting, diarrhea, headache, constipation, dizziness, syncope, chest pain, palpitations, shortness of breath, dysuria, urgency, frequency.    PNC: unc  GBS: pos  EFW: 4000 (extrapolated from 7#2 at 36w)     ObHx:  2018 FT  8#7  GynHx: denies  MedHx: denies  SrgHx: denies  PsychHx: denies  SocialHx: denies  AllergyHx: denies  RxHx: denies

## 2022-07-10 ENCOUNTER — TRANSCRIPTION ENCOUNTER (OUTPATIENT)
Age: 37
End: 2022-07-10

## 2022-07-10 VITALS
RESPIRATION RATE: 18 BRPM | TEMPERATURE: 98 F | DIASTOLIC BLOOD PRESSURE: 63 MMHG | OXYGEN SATURATION: 98 % | SYSTOLIC BLOOD PRESSURE: 103 MMHG | HEART RATE: 73 BPM

## 2022-07-10 PROCEDURE — G0463: CPT

## 2022-07-10 PROCEDURE — 86901 BLOOD TYPING SEROLOGIC RH(D): CPT

## 2022-07-10 PROCEDURE — 59050 FETAL MONITOR W/REPORT: CPT

## 2022-07-10 PROCEDURE — 87635 SARS-COV-2 COVID-19 AMP PRB: CPT

## 2022-07-10 PROCEDURE — 86900 BLOOD TYPING SEROLOGIC ABO: CPT

## 2022-07-10 PROCEDURE — 86769 SARS-COV-2 COVID-19 ANTIBODY: CPT

## 2022-07-10 PROCEDURE — 86850 RBC ANTIBODY SCREEN: CPT

## 2022-07-10 PROCEDURE — 59025 FETAL NON-STRESS TEST: CPT

## 2022-07-10 PROCEDURE — 85025 COMPLETE CBC W/AUTO DIFF WBC: CPT

## 2022-07-10 PROCEDURE — 86780 TREPONEMA PALLIDUM: CPT

## 2022-07-10 RX ADMIN — Medication 600 MILLIGRAM(S): at 12:40

## 2022-07-10 RX ADMIN — Medication 600 MILLIGRAM(S): at 05:45

## 2022-07-10 RX ADMIN — Medication 975 MILLIGRAM(S): at 10:15

## 2022-07-10 RX ADMIN — Medication 600 MILLIGRAM(S): at 00:25

## 2022-07-10 RX ADMIN — Medication 975 MILLIGRAM(S): at 10:45

## 2022-07-10 RX ADMIN — Medication 600 MILLIGRAM(S): at 06:40

## 2022-07-10 RX ADMIN — MAGNESIUM HYDROXIDE 30 MILLILITER(S): 400 TABLET, CHEWABLE ORAL at 08:23

## 2022-07-10 RX ADMIN — Medication 600 MILLIGRAM(S): at 13:10

## 2022-07-10 NOTE — DISCHARGE NOTE OB - PATIENT PORTAL LINK FT
You can access the FollowMyHealth Patient Portal offered by [facility] by registering at the following website: [email or web address]. By joining [employer]’s FollowMyHealth portal, you will also be able to view your health information using other applications (apps) compatible with our system.

## 2022-07-10 NOTE — DISCHARGE NOTE OB - NS MD DC FALL RISK RISK
For information on Fall & Injury Prevention, visit: [email or web address] OR  [email or web address] OR  https://www.cdc.gov/steadi/patient.html

## 2022-07-10 NOTE — DISCHARGE NOTE OB - MATERIALS PROVIDED
[facility] [place] Screening Program/Breastfeeding Log/Breastfeeding Mother’s Support Group Information/Guide to Postpartum Care/[facility] Hearing Screen Program/Back To Sleep Handout/Shaken Baby Prevention Handout/Breastfeeding Guide and Packet/Birth Certificate Instructions/Discharge Medication Information for Patients and Families Pocket Guide

## 2022-07-10 NOTE — DISCHARGE NOTE OB - MEDICATION SUMMARY - MEDICATIONS TO TAKE
I will START or STAY ON the medications listed below when I get home from the hospital:    Tylenol 325 mg oral capsule  -- 3 cap(s) by mouth every 6 hours, As Needed  -- Indication: For pain med    Motrin 600 mg oral tablet  -- 1 tab(s) by mouth every 6 hours, As Needed  -- Indication: For pain med    Prenatal Multivitamins with Folic Acid 1 mg oral tablet  -- 1 tab(s) by mouth once a day  -- Indication: For home med

## 2022-07-10 NOTE — DISCHARGE NOTE OB - CARE PLAN
Principal Discharge DX:	 (normal spontaneous vaginal delivery)  Assessment and plan of treatment:	Take tylenol and motrin as directed, alternating every 3 hours.   Continue iron and prenatal vitamin daily. Take colace and mylicon as needed for constipation and gas pain.   Nothing in the vagina and no exercise until 6 week visit. You may continue bleeding for several weeks.  Follow up in the office in 6 weeks for full postpartum visit.   Call the office for appointment confirmation and with any concerns, including breast infection, wound infection, postpartum depression, high blood pressure, and painful calves.   1

## 2022-07-10 NOTE — DISCHARGE NOTE OB - CARE PROVIDER_API CALL
Bruna Sellers)  Obstetrics and Gynecology  [address] [facility], [address] [place] [address]  Phone: [phone]  Fax: [phone]  Follow Up Time:

## 2022-07-10 NOTE — PROGRESS NOTE ADULT - SUBJECTIVE AND OBJECTIVE BOX
Postpartum Note- PPD#1    Allergies: No Known Allergies    Blood type: A positive   Rubella: Immune  RPR: Negative     S: Patient is a 36y  PPD#1 s/p  with an EBL of 400cc.    Patient w/o complaints, pain is controlled.    Pt is OOB, tolerating PO, passing flatus. Lochia WNL.     O:  Vital Signs Last 24 Hrs  T(C): 36.6 (10 Jul 2022 05:06), Max: 37.2 (2022 11:00)  T(F): 97.9 (10 Jul 2022 05:06), Max: 98.96 (2022 11:00)  HR: 73 (10 Jul 2022 05:06) (72 - 153)  BP: 103/63 (10 Jul 2022 05:06) (103/58 - 246/116)  BP(mean): --  RR: 18 (10 Jul 2022 05:06) (18 - 18)  SpO2: 98% (10 Jul 2022 05:06) (87% - 100%)    Parameters below as of 10 Jul 2022 05:06  Patient On (Oxygen Delivery Method): room air     Gen: NAD  Abdomen: Soft, nontender, non-distended, fundus firm.  Lochia WNL  Ext: Neg edema, Neg calf tenderness    LABS:  Hemoglobin: 12.1 g/dL (22 @ 05:12)  Hematocrit: 37.8 % (22 @ 05:12)      A/P:  36y PPD#1 s/p , doing well.      PMHx: Denies  Current Issues: None    Increase OOB  Regular diet  PO Pain protocol  Continue routine post partum care    Kaylee Burgos PA-C

## 2022-09-15 NOTE — DISCHARGE NOTE OB - IF YOU HAVE EPISIOTOMY OR TEAR REPAIR, USE ANESTHETIC SPRAY, CREAM OR FOAM AS DIRECTED BY YOUR HEALTHCARE PROVIDER FOR RELIEF OF DISCOMFORT.  REFER TO MEDICATION DISCHARGE FORM
What Type Of Note Output Would You Prefer (Optional)?: Standard Output What Is The Reason For Today's Visit?: Full Body Skin Examination What Is The Reason For Today's Visit? (Being Monitored For X): concerning skin lesions on an annual basis Statement Selected

## 2023-04-20 NOTE — OB RN PATIENT PROFILE - NS PRO RUBELLA RECEIVED Y/N
Pt called back stating she needed an order faxed to her PCP for pre-op clearance.  Order entered and faxed to [phone].   no...

## 2024-03-25 RX ORDER — ACETAMINOPHEN 500 MG
3 TABLET ORAL
Qty: 0 | Refills: 0 | DISCHARGE

## 2024-03-25 RX ORDER — IBUPROFEN 200 MG
1 TABLET ORAL
Qty: 0 | Refills: 0 | DISCHARGE

## 2024-10-24 ENCOUNTER — INPATIENT (INPATIENT)
Facility: HOSPITAL | Age: 39
LOS: 1 days | Discharge: HOME | DRG: 951 | End: 2024-10-26
Attending: OBSTETRICS & GYNECOLOGY | Admitting: OBSTETRICS & GYNECOLOGY
Payer: COMMERCIAL

## 2024-10-24 VITALS — OXYGEN SATURATION: 99 %

## 2024-10-24 DIAGNOSIS — Z34.80 ENCOUNTER FOR SUPERVISION OF OTHER NORMAL PREGNANCY, UNSPECIFIED TRIMESTER: ICD-10-CM

## 2024-10-24 DIAGNOSIS — O26.899 OTHER SPECIFIED PREGNANCY RELATED CONDITIONS, UNSPECIFIED TRIMESTER: ICD-10-CM

## 2024-10-24 LAB
BASOPHILS # BLD AUTO: 0.06 K/UL — SIGNIFICANT CHANGE UP (ref 0–0.2)
BASOPHILS NFR BLD AUTO: 0.5 % — SIGNIFICANT CHANGE UP (ref 0–2)
EOSINOPHIL # BLD AUTO: 0.17 K/UL — SIGNIFICANT CHANGE UP (ref 0–0.5)
EOSINOPHIL NFR BLD AUTO: 1.4 % — SIGNIFICANT CHANGE UP (ref 0–6)
HCT VFR BLD CALC: 37.2 % — SIGNIFICANT CHANGE UP (ref 34.5–45)
HGB BLD-MCNC: 12.1 G/DL — SIGNIFICANT CHANGE UP (ref 11.5–15.5)
IMM GRANULOCYTES NFR BLD AUTO: 1.1 % — HIGH (ref 0–0.9)
LYMPHOCYTES # BLD AUTO: 25.9 % — SIGNIFICANT CHANGE UP (ref 13–44)
LYMPHOCYTES # BLD AUTO: 3.15 K/UL — SIGNIFICANT CHANGE UP (ref 1–3.3)
MCHC RBC-ENTMCNC: 29.9 PG — SIGNIFICANT CHANGE UP (ref 27–34)
MCHC RBC-ENTMCNC: 32.5 GM/DL — SIGNIFICANT CHANGE UP (ref 32–36)
MCV RBC AUTO: 91.9 FL — SIGNIFICANT CHANGE UP (ref 80–100)
MONOCYTES # BLD AUTO: 1.13 K/UL — HIGH (ref 0–0.9)
MONOCYTES NFR BLD AUTO: 9.3 % — SIGNIFICANT CHANGE UP (ref 2–14)
NEUTROPHILS # BLD AUTO: 7.53 K/UL — HIGH (ref 1.8–7.4)
NEUTROPHILS NFR BLD AUTO: 61.8 % — SIGNIFICANT CHANGE UP (ref 43–77)
NRBC # BLD: 0 /100 WBCS — SIGNIFICANT CHANGE UP (ref 0–0)
NRBC BLD-RTO: 0 /100 WBCS — SIGNIFICANT CHANGE UP (ref 0–0)
PLATELET # BLD AUTO: 276 K/UL — SIGNIFICANT CHANGE UP (ref 150–400)
RBC # BLD: 4.05 M/UL — SIGNIFICANT CHANGE UP (ref 3.8–5.2)
RBC # FLD: 12.3 % — SIGNIFICANT CHANGE UP (ref 10.3–14.5)
WBC # BLD: 12.18 K/UL — HIGH (ref 3.8–10.5)
WBC # FLD AUTO: 12.18 K/UL — HIGH (ref 3.8–10.5)

## 2024-10-24 RX ORDER — AMPICILLIN SODIUM 1 G/1
1 INJECTION, POWDER, FOR SOLUTION INTRAMUSCULAR; INTRAVENOUS EVERY 4 HOURS
Refills: 0 | Status: DISCONTINUED | OUTPATIENT
Start: 2024-10-24 | End: 2024-10-25

## 2024-10-24 RX ORDER — SODIUM CHLORIDE 9 G/1000ML
1000 INJECTION, SOLUTION INTRAVENOUS
Refills: 0 | Status: DISCONTINUED | OUTPATIENT
Start: 2024-10-24 | End: 2024-10-25

## 2024-10-24 RX ORDER — CITRIC ACID/SODIUM CITRATE 300-500 MG
15 SOLUTION, ORAL ORAL EVERY 6 HOURS
Refills: 0 | Status: DISCONTINUED | OUTPATIENT
Start: 2024-10-24 | End: 2024-10-25

## 2024-10-24 RX ORDER — AMPICILLIN SODIUM 1 G/1
2 INJECTION, POWDER, FOR SOLUTION INTRAMUSCULAR; INTRAVENOUS ONCE
Refills: 0 | Status: DISCONTINUED | OUTPATIENT
Start: 2024-10-24 | End: 2024-10-25

## 2024-10-24 RX ORDER — OXYTOCIN-SODIUM CHLORIDE 0.9% IV SOLN 30 UNIT/500ML 30-0.9/5 UT/ML-%
167 SOLUTION INTRAVENOUS
Qty: 30 | Refills: 0 | Status: DISCONTINUED | OUTPATIENT
Start: 2024-10-24 | End: 2024-10-26

## 2024-10-24 RX ADMIN — AMPICILLIN SODIUM 200 GRAM(S): 1 INJECTION, POWDER, FOR SOLUTION INTRAMUSCULAR; INTRAVENOUS at 23:10

## 2024-10-25 DIAGNOSIS — K08.409 PARTIAL LOSS OF TEETH, UNSPECIFIED CAUSE, UNSPECIFIED CLASS: Chronic | ICD-10-CM

## 2024-10-25 LAB
BLD GP AB SCN SERPL QL: NEGATIVE — SIGNIFICANT CHANGE UP
RH IG SCN BLD-IMP: POSITIVE — SIGNIFICANT CHANGE UP
T PALLIDUM AB TITR SER: NEGATIVE — SIGNIFICANT CHANGE UP

## 2024-10-25 PROCEDURE — 71046 X-RAY EXAM CHEST 2 VIEWS: CPT | Mod: 26

## 2024-10-25 RX ORDER — DIPHENHYDRAMINE HCL 12.5MG/5ML
25 ELIXIR ORAL EVERY 6 HOURS
Refills: 0 | Status: DISCONTINUED | OUTPATIENT
Start: 2024-10-25 | End: 2024-10-26

## 2024-10-25 RX ORDER — KETOROLAC TROMETHAMINE 30 MG/ML
30 INJECTION, SOLUTION INTRAMUSCULAR; INTRAVENOUS ONCE
Refills: 0 | Status: DISCONTINUED | OUTPATIENT
Start: 2024-10-25 | End: 2024-10-25

## 2024-10-25 RX ORDER — WITCH HAZEL LEAF
1 FLUID EXTRACT MISCELLANEOUS EVERY 4 HOURS
Refills: 0 | Status: DISCONTINUED | OUTPATIENT
Start: 2024-10-25 | End: 2024-10-26

## 2024-10-25 RX ORDER — MODIFIED LANOLIN 100 %
1 CREAM (GRAM) TOPICAL EVERY 6 HOURS
Refills: 0 | Status: DISCONTINUED | OUTPATIENT
Start: 2024-10-25 | End: 2024-10-26

## 2024-10-25 RX ORDER — OXYCODONE HYDROCHLORIDE 30 MG/1
5 TABLET ORAL
Refills: 0 | Status: DISCONTINUED | OUTPATIENT
Start: 2024-10-25 | End: 2024-10-26

## 2024-10-25 RX ORDER — MAGNESIUM HYDROXIDE 400 MG/5ML
30 SUSPENSION ORAL
Refills: 0 | Status: DISCONTINUED | OUTPATIENT
Start: 2024-10-25 | End: 2024-10-26

## 2024-10-25 RX ORDER — ACETAMINOPHEN 500 MG/5ML
975 LIQUID (ML) ORAL EVERY 6 HOURS
Refills: 0 | Status: COMPLETED | OUTPATIENT
Start: 2024-10-25 | End: 2025-09-23

## 2024-10-25 RX ORDER — IBUPROFEN 200 MG
600 TABLET ORAL EVERY 6 HOURS
Refills: 0 | Status: COMPLETED | OUTPATIENT
Start: 2024-10-25 | End: 2025-09-23

## 2024-10-25 RX ORDER — IBUPROFEN 200 MG
1 TABLET ORAL
Qty: 0 | Refills: 0 | DISCHARGE
Start: 2024-10-25

## 2024-10-25 RX ORDER — ACETAMINOPHEN 500 MG/5ML
1000 LIQUID (ML) ORAL ONCE
Refills: 0 | Status: DISCONTINUED | OUTPATIENT
Start: 2024-10-25 | End: 2024-10-26

## 2024-10-25 RX ORDER — OXYTOCIN-SODIUM CHLORIDE 0.9% IV SOLN 30 UNIT/500ML 30-0.9/5 UT/ML-%
167 SOLUTION INTRAVENOUS
Qty: 30 | Refills: 0 | Status: DISCONTINUED | OUTPATIENT
Start: 2024-10-25 | End: 2024-10-26

## 2024-10-25 RX ORDER — ACETAMINOPHEN 500 MG/5ML
975 LIQUID (ML) ORAL EVERY 6 HOURS
Refills: 0 | Status: DISCONTINUED | OUTPATIENT
Start: 2024-10-25 | End: 2024-10-26

## 2024-10-25 RX ORDER — ACETAMINOPHEN 500 MG/5ML
3 LIQUID (ML) ORAL
Qty: 0 | Refills: 0 | DISCHARGE
Start: 2024-10-25

## 2024-10-25 RX ORDER — DIBUCAINE 10 MG/G
1 OINTMENT TOPICAL EVERY 6 HOURS
Refills: 0 | Status: DISCONTINUED | OUTPATIENT
Start: 2024-10-25 | End: 2024-10-26

## 2024-10-25 RX ORDER — HYDROCORTISONE 10 MG/G
1 CREAM TOPICAL EVERY 6 HOURS
Refills: 0 | Status: DISCONTINUED | OUTPATIENT
Start: 2024-10-25 | End: 2024-10-26

## 2024-10-25 RX ORDER — BENZOCAINE 220 MG/G
1 SPRAY, METERED PERIODONTAL EVERY 6 HOURS
Refills: 0 | Status: DISCONTINUED | OUTPATIENT
Start: 2024-10-25 | End: 2024-10-26

## 2024-10-25 RX ORDER — IBUPROFEN 200 MG
600 TABLET ORAL EVERY 6 HOURS
Refills: 0 | Status: DISCONTINUED | OUTPATIENT
Start: 2024-10-25 | End: 2024-10-26

## 2024-10-25 RX ORDER — OXYCODONE HYDROCHLORIDE 30 MG/1
5 TABLET ORAL ONCE
Refills: 0 | Status: DISCONTINUED | OUTPATIENT
Start: 2024-10-25 | End: 2024-10-26

## 2024-10-25 RX ORDER — PRENATAL 136/IRON/FOLIC ACID 27 MG-1 MG
1 TABLET ORAL DAILY
Refills: 0 | Status: DISCONTINUED | OUTPATIENT
Start: 2024-10-25 | End: 2024-10-26

## 2024-10-25 RX ORDER — SIMETHICONE 80 MG
80 TABLET,CHEWABLE ORAL EVERY 4 HOURS
Refills: 0 | Status: DISCONTINUED | OUTPATIENT
Start: 2024-10-25 | End: 2024-10-26

## 2024-10-25 RX ORDER — PRAMOXINE HCL 1 %
1 GEL (GRAM) TOPICAL EVERY 4 HOURS
Refills: 0 | Status: DISCONTINUED | OUTPATIENT
Start: 2024-10-25 | End: 2024-10-26

## 2024-10-25 RX ORDER — INFLUENZA A VIRUS A/IDAHO/07/2018 (H1N1) ANTIGEN (MDCK CELL DERIVED, PROPIOLACTONE INACTIVATED, INFLUENZA A VIRUS A/INDIANA/08/2018 (H3N2) ANTIGEN (MDCK CELL DERIVED, PROPIOLACTONE INACTIVATED), INFLUENZA B VIRUS B/SINGAPORE/INFTT-16-0610/2016 ANTIGEN (MDCK CELL DERIVED, PROPIOLACTONE INACTIVATED), INFLUENZA B VIRUS B/IOWA/06/2017 ANTIGEN (MDCK CELL DERIVED, PROPIOLACTONE INACTIVATED) 15; 15; 15; 15 UG/.5ML; UG/.5ML; UG/.5ML; UG/.5ML
0.5 INJECTION, SUSPENSION INTRAMUSCULAR ONCE
Refills: 0 | Status: DISCONTINUED | OUTPATIENT
Start: 2024-10-25 | End: 2024-10-26

## 2024-10-25 RX ADMIN — Medication 3 MILLILITER(S): at 05:31

## 2024-10-25 RX ADMIN — Medication 600 MILLIGRAM(S): at 09:15

## 2024-10-25 RX ADMIN — KETOROLAC TROMETHAMINE 30 MILLIGRAM(S): 30 INJECTION, SOLUTION INTRAMUSCULAR; INTRAVENOUS at 01:26

## 2024-10-25 RX ADMIN — Medication 600 MILLIGRAM(S): at 08:35

## 2024-10-25 RX ADMIN — Medication 600 MILLIGRAM(S): at 18:32

## 2024-10-25 RX ADMIN — Medication 975 MILLIGRAM(S): at 22:44

## 2024-10-25 RX ADMIN — Medication 975 MILLIGRAM(S): at 23:25

## 2024-10-25 RX ADMIN — Medication 3 MILLILITER(S): at 13:54

## 2024-10-26 VITALS
RESPIRATION RATE: 18 BRPM | OXYGEN SATURATION: 99 % | HEART RATE: 87 BPM | DIASTOLIC BLOOD PRESSURE: 69 MMHG | SYSTOLIC BLOOD PRESSURE: 102 MMHG | TEMPERATURE: 98 F

## 2024-10-26 PROCEDURE — 71046 X-RAY EXAM CHEST 2 VIEWS: CPT

## 2024-10-26 PROCEDURE — 59025 FETAL NON-STRESS TEST: CPT

## 2024-10-26 PROCEDURE — 86901 BLOOD TYPING SEROLOGIC RH(D): CPT

## 2024-10-26 PROCEDURE — 86900 BLOOD TYPING SEROLOGIC ABO: CPT

## 2024-10-26 PROCEDURE — 86780 TREPONEMA PALLIDUM: CPT

## 2024-10-26 PROCEDURE — 86850 RBC ANTIBODY SCREEN: CPT

## 2024-10-26 PROCEDURE — 85025 COMPLETE CBC W/AUTO DIFF WBC: CPT

## 2024-10-26 PROCEDURE — 59050 FETAL MONITOR W/REPORT: CPT

## 2024-10-26 RX ADMIN — Medication 600 MILLIGRAM(S): at 12:45

## 2024-10-26 RX ADMIN — Medication 975 MILLIGRAM(S): at 08:54

## 2024-10-26 RX ADMIN — Medication 1 TABLET(S): at 11:55

## 2024-10-26 RX ADMIN — Medication 975 MILLIGRAM(S): at 09:45

## 2024-10-26 RX ADMIN — Medication 600 MILLIGRAM(S): at 05:52

## 2024-10-26 RX ADMIN — Medication 600 MILLIGRAM(S): at 06:25

## 2024-10-26 RX ADMIN — Medication 600 MILLIGRAM(S): at 11:54

## 2024-11-15 NOTE — PATIENT PROFILE OB - PRESSURE ULCER(S)
"Dr. Boss --  Please review and advise: blood pressure/medication.   Patient reports blood pressure has come down from when she saw you.   Taking Losartan daily.   Blood pressures: 152/85 and 151/80 consistently.   Needs new script for Losartan sent to pharmacy.   Pended below.     Writer called patient and relayed message from Dr. Boss.   ------------  ----- Message from Lynn Boss sent at 11/14/2024  9:21 PM CST -----  RN team- can you please call patient to let her know about prediabetes result?  She also had elevated blood pressures during our clinic visit and was going to call to let me know how they were looking at home.  If they are consistently more than 140/90 at home, then I'll recommend she restart her losartan.    Primary care team: Could you please send the patient the following results letter?  Thank you!    Dear Avis,    I reviewed your results and they look overall really good.  However, you were right to ask about your blood sugars because your hemoglobin a1c (average blood sugar over 3 months) is elevated - this means you have impaired glucose tolerance, also known as \"pre-diabetes.\" This means you are at a higher risk of developing diabetes. Eating healthy and walking are the best ways to help prevent diabetes from developing. Genetics also plays a big role in someone's risk for diabetes.  We will check your hemoglobin a1c every year to monitor this. The best thing you can do right now is reducing your carbohydrate intake.    Please continue with your current plan and let us know if you have questions.    Thank you,    Lynn Boss, DO  Internal Medicine - Pediatrics Physician  [facility]    CLAUDIA Gonzalez RN  [facility]    " no